# Patient Record
Sex: FEMALE | Race: WHITE | NOT HISPANIC OR LATINO | URBAN - METROPOLITAN AREA
[De-identification: names, ages, dates, MRNs, and addresses within clinical notes are randomized per-mention and may not be internally consistent; named-entity substitution may affect disease eponyms.]

---

## 2021-10-20 ENCOUNTER — OFFICE VISIT (OUTPATIENT)
Dept: URGENT CARE | Facility: CLINIC | Age: 31
End: 2021-10-20
Payer: COMMERCIAL

## 2021-10-20 VITALS
TEMPERATURE: 97 F | OXYGEN SATURATION: 99 % | RESPIRATION RATE: 16 BRPM | SYSTOLIC BLOOD PRESSURE: 111 MMHG | WEIGHT: 143 LBS | HEART RATE: 88 BPM | DIASTOLIC BLOOD PRESSURE: 60 MMHG

## 2021-10-20 DIAGNOSIS — R07.82 INTERCOSTAL PAIN: ICD-10-CM

## 2021-10-20 DIAGNOSIS — H65.192 OTHER NON-RECURRENT ACUTE NONSUPPURATIVE OTITIS MEDIA OF LEFT EAR: Primary | ICD-10-CM

## 2021-10-20 PROCEDURE — 99203 OFFICE O/P NEW LOW 30 MIN: CPT | Performed by: PHYSICIAN ASSISTANT

## 2021-10-20 RX ORDER — AMOXICILLIN 500 MG/1
500 TABLET, FILM COATED ORAL 2 TIMES DAILY
Qty: 14 TABLET | Refills: 0 | Status: SHIPPED | OUTPATIENT
Start: 2021-10-20 | End: 2021-10-27

## 2021-12-30 ENCOUNTER — CONSULT (OUTPATIENT)
Dept: PULMONOLOGY | Facility: CLINIC | Age: 31
End: 2021-12-30
Payer: COMMERCIAL

## 2021-12-30 VITALS
DIASTOLIC BLOOD PRESSURE: 76 MMHG | SYSTOLIC BLOOD PRESSURE: 116 MMHG | WEIGHT: 147.2 LBS | OXYGEN SATURATION: 98 % | HEART RATE: 83 BPM | HEIGHT: 60 IN | TEMPERATURE: 98.7 F | BODY MASS INDEX: 28.9 KG/M2

## 2021-12-30 DIAGNOSIS — R09.81 SINUS CONGESTION: ICD-10-CM

## 2021-12-30 DIAGNOSIS — R06.00 DYSPNEA ON EXERTION: Primary | ICD-10-CM

## 2021-12-30 PROBLEM — R06.09 DYSPNEA ON EXERTION: Status: ACTIVE | Noted: 2021-12-30

## 2021-12-30 PROCEDURE — 99204 OFFICE O/P NEW MOD 45 MIN: CPT | Performed by: INTERNAL MEDICINE

## 2021-12-30 RX ORDER — ALBUTEROL SULFATE 90 UG/1
2 AEROSOL, METERED RESPIRATORY (INHALATION) EVERY 4 HOURS PRN
Qty: 18 G | Refills: 1 | Status: SHIPPED | OUTPATIENT
Start: 2021-12-30

## 2021-12-30 RX ORDER — MESALAMINE 1.2 G/1
1200 TABLET, DELAYED RELEASE ORAL
COMMUNITY

## 2021-12-30 RX ORDER — FLUTICASONE PROPIONATE 50 MCG
1 SPRAY, SUSPENSION (ML) NASAL DAILY
Qty: 18.2 ML | Refills: 0 | Status: SHIPPED | OUTPATIENT
Start: 2021-12-30 | End: 2022-01-27

## 2022-01-27 DIAGNOSIS — R09.81 SINUS CONGESTION: ICD-10-CM

## 2022-01-27 RX ORDER — FLUTICASONE PROPIONATE 50 MCG
SPRAY, SUSPENSION (ML) NASAL
Qty: 16 ML | Refills: 11 | Status: SHIPPED | OUTPATIENT
Start: 2022-01-27

## 2022-02-10 ENCOUNTER — TELEPHONE (OUTPATIENT)
Dept: PULMONOLOGY | Facility: MEDICAL CENTER | Age: 32
End: 2022-02-10

## 2022-02-10 NOTE — TELEPHONE ENCOUNTER
Attempted to contact patient to reschedule  missed appointment unable to [facility] mailbox full  No show letter mailed

## 2022-03-09 ENCOUNTER — APPOINTMENT (OUTPATIENT)
Dept: RADIOLOGY | Facility: CLINIC | Age: 32
End: 2022-03-09
Payer: COMMERCIAL

## 2022-03-09 ENCOUNTER — OFFICE VISIT (OUTPATIENT)
Dept: OBGYN CLINIC | Facility: CLINIC | Age: 32
End: 2022-03-09
Payer: COMMERCIAL

## 2022-03-09 VITALS
DIASTOLIC BLOOD PRESSURE: 68 MMHG | HEIGHT: 60 IN | SYSTOLIC BLOOD PRESSURE: 102 MMHG | HEART RATE: 88 BPM | WEIGHT: 145 LBS | BODY MASS INDEX: 28.47 KG/M2

## 2022-03-09 DIAGNOSIS — S76.312A HAMSTRING STRAIN, LEFT, INITIAL ENCOUNTER: Primary | ICD-10-CM

## 2022-03-09 DIAGNOSIS — G89.29 CHRONIC PAIN OF BOTH KNEES: ICD-10-CM

## 2022-03-09 DIAGNOSIS — M25.562 PAIN IN BOTH KNEES, UNSPECIFIED CHRONICITY: ICD-10-CM

## 2022-03-09 DIAGNOSIS — M25.562 CHRONIC PAIN OF BOTH KNEES: ICD-10-CM

## 2022-03-09 DIAGNOSIS — M25.561 CHRONIC PAIN OF BOTH KNEES: ICD-10-CM

## 2022-03-09 DIAGNOSIS — M54.50 LUMBAR SPINE PAIN: ICD-10-CM

## 2022-03-09 DIAGNOSIS — M25.561 PAIN IN BOTH KNEES, UNSPECIFIED CHRONICITY: ICD-10-CM

## 2022-03-09 PROCEDURE — 73562 X-RAY EXAM OF KNEE 3: CPT

## 2022-03-09 PROCEDURE — 99203 OFFICE O/P NEW LOW 30 MIN: CPT | Performed by: ORTHOPAEDIC SURGERY

## 2022-03-09 NOTE — PROGRESS NOTES
Assessment/Plan:  1  Hamstring strain, left, initial encounter  Ambulatory Referral to Physical Therapy   2  Chronic pain of both knees  XR knee 3 vw left non injury    XR knee 3 vw right non injury    Ambulatory Referral to Physical Therapy   3  Lumbar spine pain  Ambulatory Referral to Physical Therapy     Scribe Attestation    I,:  Michelle Riojsa am acting as a scribe while in the presence of the attending physician :       I,:  Mazin Eubanks,  personally performed the services described in this documentation    as scribed in my presence :         Henri Burt is a pleasant 81-year-old female who presents today for initial evaluation of her bilateral knee pain  After reviewing her imaging and performing a thorough history and physical exam I explained that her pain complaints and exam today are consistent with a hamstring strain  She does also present with some lumbar spine pain on exam today  I encouraged her to follow-up with Cardiology tomorrow as scheduled for her chest pain and shortness of breath  I believe this might be psychosomatic in nature  With respect to her pain, I recommended initiating physical therapy and provided her with a referral today  I would like to see her back in 4 weeks for repeat clinical evaluation  Advanced imaging may be warranted at that time pending her symptoms  Subjective:  Initial evaluation for bilateral knee pain    Patient ID: Faisal Blanca is a 28 y o  female who presents today for initial evaluation of her bilateral knee pain  She reports that this pain began approximately 1 month ago and is worse on the left over the right  She describes sharp and electric pain about the posterior aspect of her knees  This pain can radiate proximally about the posterior aspect of her leg and is also accompanied by chest pain and shortness of breath  She reports that the chest pain and shortness of breath quickly resolves after onset    She reports that her pain is better in the morning upon waking and worsens after sitting for prolonged periods of time  She was recently evaluated in the emergency department at [facility] and was instructed to follow-up with the cardiologist and an orthopedist   She has a follow-up with Cardiology tomorrow  She denies any acute injury or trauma  She denies any mechanical symptoms about the knee  She denies any distal paresthesias of the lower extremity or back pain  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  Review of Systems   Constitutional: Positive for activity change  Negative for chills, fever and unexpected weight change  HENT: Negative for hearing loss, nosebleeds and sore throat  Eyes: Negative for pain, redness and visual disturbance  Respiratory: Negative for cough, shortness of breath and wheezing  Cardiovascular: Negative for chest pain, palpitations and leg swelling  Gastrointestinal: Negative for abdominal pain, nausea and vomiting  Endocrine: Negative for polydipsia and polyuria  Genitourinary: Negative for dysuria and hematuria  Musculoskeletal: Positive for arthralgias and myalgias  Negative for joint swelling  See HPI   Skin: Negative for rash and wound  Neurological: Negative for dizziness, numbness and headaches  Psychiatric/Behavioral: Negative for decreased concentration and suicidal ideas  The patient is not nervous/anxious  Past Medical History:   Diagnosis Date    PKU (phenylketonuria) ([facility] )     UC (ulcerative colitis confined to rectum) ([facility] )        History reviewed  No pertinent surgical history      Family History   Family history unknown: Yes       Social History     Occupational History    Not on file   Tobacco Use    Smoking status: Never Smoker    Smokeless tobacco: Never Used   Vaping Use    Vaping Use: Never used   Substance and Sexual Activity    Alcohol use: Yes     Comment: social    Drug use: Never    Sexual activity: Not on file         Current Outpatient Medications:     albuterol (Ventolin HFA) 90 mcg/act inhaler, Inhale 2 puffs every 4 (four) hours as needed for wheezing, Disp: 18 g, Rfl: 1    fluticasone (FLONASE) 50 mcg/act nasal spray, SPRAY 1 SPRAY INTO EACH NOSTRIL EVERY DAY, Disp: 16 mL, Rfl: 11    mesalamine (LIALDA) 1 2 g EC tablet, Take 1,200 mg by mouth daily with breakfast, Disp: , Rfl:     Allergies   Allergen Reactions    Phenylalanine Other (See Comments)    Aspartame - Food Allergy Other (See Comments)     Has PKU    Protein C Other (See Comments)     Has PKU       Objective:  Vitals:    03/09/22 0822   BP: 102/68   Pulse: 88       Body mass index is 28 32 kg/m²  Right Knee Exam     Tenderness   The patient is experiencing no tenderness  Range of Motion   Right knee extension: 0  Right knee flexion: 135  Tests   Madalyn:  Medial - negative Lateral - negative  Varus: negative Valgus: negative  Drawer:  Anterior - negative    Posterior - negative    Other   Erythema: absent  Scars: absent  Sensation: normal  Pulse: present  Swelling: none  Effusion: no effusion present    Comments:  Stable at 0, 30 90°  Neurovascularly intact distally  No warmth or erythema  No crepitance appreciated  Patellofemoral grind:  Negative  Apley's:  Negative  Hamstring 5/5      Left Knee Exam     Tenderness   The patient is experiencing no tenderness  Range of Motion   Left knee extension: 0  Left knee flexion: 135       Tests   Madalyn:  Medial - negative Lateral - negative  Varus: negative Valgus: negative  Drawer:  Anterior - negative     Posterior - negative    Other   Erythema: absent  Scars: present (traumatic anterior knee scars)  Sensation: normal  Pulse: present  Swelling: none  Effusion: no effusion present    Comments:  Stable at 0, 30 90°  Neurovascularly intact distally  No warmth or erythema  No crepitance appreciated  Patellofemoral grind:  Negative  Apley's:  Negative  Neuro SLR: negative  Hamstring 4+/5      Back Exam Tenderness   The patient is experiencing tenderness in the lumbar (left lumbar paraspinals)  Other   Sensation: normal  Gait: normal           Observations   Left Knee   Negative for effusion  Right Knee   Negative for effusion  Physical Exam  Vitals and nursing note reviewed  Constitutional:       Appearance: She is well-developed  HENT:      Head: Normocephalic and atraumatic  Eyes:      General: No scleral icterus  Conjunctiva/sclera: Conjunctivae normal    Cardiovascular:      Rate and Rhythm: Normal rate  Pulmonary:      Effort: Pulmonary effort is normal  No respiratory distress  Musculoskeletal:      Cervical back: Normal range of motion and neck supple  Right knee: No effusion  Instability Tests: Medial Madalyn test negative and lateral Madalyn test negative  Left knee: No effusion  Instability Tests: Medial Madalyn test negative and lateral Madalyn test negative  Comments: As noted in HPI   Skin:     General: Skin is warm and dry  Neurological:      Mental Status: She is alert and oriented to person, place, and time  Psychiatric:         Behavior: Behavior normal          I have personally reviewed pertinent films in PACS  Bilateral knee x-rays obtained on 03/09/2022 reviewed demonstrating no acute fracture, dislocation, lytic or blastic lesion, or significant degenerative change

## 2022-04-03 ENCOUNTER — TELEPHONE (OUTPATIENT)
Dept: OTHER | Facility: OTHER | Age: 32
End: 2022-04-03

## 2022-04-03 NOTE — TELEPHONE ENCOUNTER
Pt is requesting a call back from the office to make a new patient appointment  Patient is looking to become pregnant

## 2022-04-05 NOTE — TELEPHONE ENCOUNTER
Spoke with patient looking to get pregnant , but has a lot of medical conditions that require her to need a High Risk maternal Fetal medicine doc as well explained to her how we handled high risk patient in conjunction with MFM , gave patient number to both our office and MFM will be reaching out to them and calling back to schedule appointment    MM JAMES

## 2022-04-06 ENCOUNTER — TELEPHONE (OUTPATIENT)
Dept: PERINATAL CARE | Facility: CLINIC | Age: 32
End: 2022-04-06

## 2022-04-06 NOTE — TELEPHONE ENCOUNTER
Patient scheduled for a preconception visit  She will be having some of her most recent tests and notes pertaining to her health faxed to me specifically for the consult

## 2022-04-06 NOTE — TELEPHONE ENCOUNTER
Patient called, left voicemail message looking for a preconception visit  Called her back and left a message for her to call us back

## 2022-04-30 PROBLEM — Z31.69 ENCOUNTER FOR PRECONCEPTION CONSULTATION: Status: RESOLVED | Noted: 2022-04-30 | Resolved: 2022-04-30

## 2022-04-30 PROBLEM — Z31.69 ENCOUNTER FOR PRECONCEPTION CONSULTATION: Status: ACTIVE | Noted: 2022-04-30

## 2022-04-30 PROBLEM — E70.1 PKU (PHENYLKETONURIA) (HCC): Status: ACTIVE | Noted: 2017-08-16

## 2022-04-30 PROBLEM — K51.90 ULCERATIVE COLITIS (HCC): Status: ACTIVE | Noted: 2017-08-16

## 2022-04-30 RX ORDER — SULFAMETHOXAZOLE AND TRIMETHOPRIM 800; 160 MG/1; MG/1
1 TABLET ORAL EVERY 12 HOURS
COMMUNITY
Start: 2022-03-29

## 2022-04-30 RX ORDER — SAPROPTERIN DIHYDROCHLORIDE 100 MG/1
12 TABLET ORAL
COMMUNITY
Start: 2022-04-01

## 2022-05-03 ENCOUNTER — TELEPHONE (OUTPATIENT)
Dept: PERINATAL CARE | Facility: CLINIC | Age: 32
End: 2022-05-03

## 2022-05-04 ENCOUNTER — CONSULT (OUTPATIENT)
Dept: PERINATAL CARE | Facility: CLINIC | Age: 32
End: 2022-05-04
Payer: COMMERCIAL

## 2022-05-04 VITALS
WEIGHT: 144 LBS | DIASTOLIC BLOOD PRESSURE: 67 MMHG | HEART RATE: 79 BPM | BODY MASS INDEX: 28.27 KG/M2 | HEIGHT: 60 IN | SYSTOLIC BLOOD PRESSURE: 123 MMHG

## 2022-05-04 DIAGNOSIS — K51.919 ULCERATIVE COLITIS WITH COMPLICATION, UNSPECIFIED LOCATION (HCC): ICD-10-CM

## 2022-05-04 DIAGNOSIS — E70.1 PKU (PHENYLKETONURIA) (HCC): Primary | ICD-10-CM

## 2022-05-04 DIAGNOSIS — Z31.69 ENCOUNTER FOR PRECONCEPTION CONSULTATION: ICD-10-CM

## 2022-05-04 PROCEDURE — 99205 OFFICE O/P NEW HI 60 MIN: CPT | Performed by: OBSTETRICS & GYNECOLOGY

## 2022-05-04 RX ORDER — LANOLIN ALCOHOL/MO/W.PET/CERES
800 CREAM (GRAM) TOPICAL DAILY
Qty: 90 TABLET | Refills: 5 | Status: SHIPPED | OUTPATIENT
Start: 2022-05-04

## 2022-05-04 NOTE — ASSESSMENT & PLAN NOTE
Diagnosed age 25; initial flare resolved with prednisone  Hospitalized twice in age 23 for two weeks; was on prednisone for 7 months; went into remission  Had Remicade, lost 25% of her hair and had many adverse effects; stopped 4 years ago  Has not had issues since and is OFF lialda (mesalamine)  Nixon Bradford is 7/16/22  Dr Roney Hayes at [facility] is her primary GI  Last saw 2 5-3y ago  Is supposed to take mesalamine daily  Advised to revisit with GI  Discussed importance of quiescent disease

## 2022-05-04 NOTE — ASSESSMENT & PLAN NOTE
Dr Mariann Campos and dietician is Bland Skiff at [facility] in [place]  Changed diet in February  Levels checked qweek while on preconception diet (previously every month or so)  Is aware that goal phenylalanine level would be between 2-6 mg/dL throughout gestation and that better maternal outcomes are associated with control in that range  Off Kuvan, levels were very high  Only started Charlyne Antis recently, was skeptical prior  Would continue care with Dr David Larose throughout a preganncy  Requires 12-14 packets per day which each cost $50; has not had a problem with coverage  Goals during preconception period:  1  Get Ishmael screened, after 7/16/22 (when they get ), or via lower cost option beforehand  2  Generate (via self experimentation) a list of PKU diet-friendly carbohydrates in anticipation of first trimester nausea and vomiting of pregnancy  Will need to be aggressive about prevention of first trimester N/V  Discussed co-management alongside obstetrician and various different models at [facility] as she does not currently have an obstetrician

## 2022-05-04 NOTE — LETTER
May 5, 2022     Dr Jo Ann Yanez  [phone]    Patient: Wayne Coelho   YOB: 1990   Date of Visit: 2022       Dear Dr Josh Tierney:    Thank you for referring Wayne Coelho to me for evaluation  Below are my notes for this consultation  If you have questions, please do not hesitate to call me  I look forward to following your patient along with you  Sincerely,        Stephanie Madrigal MD        CC: No Recipients  Stephanie Madrigal MD  2022  5:54 AM  Sign when Signing Visit  PRECONCEPTION CONSULTATION: MATERNAL-FETAL MEDICINE     Assessment and Plan: Ms Cesario Carvalho is a 28y o  year-old  here for preconception counseling  By issue:    Problem List Items Addressed This Visit        Digestive    Ulcerative colitis [facility])     Diagnosed age 25; initial flare resolved with prednisone  Hospitalized twice in age 23 for two weeks; was on prednisone for 7 months; went into remission  Had Remicade, lost 25% of her hair and had many adverse effects; stopped 4 years ago  Has not had issues since and is OFF lialda (mesalamine)  Virginia Estrella is 23  Dr Corby Rodriguez at [facility] is her primary GI  Last saw 2 5-3y ago  Is supposed to take mesalamine daily  Advised to revisit with GI  Discussed importance of quiescent disease  Other    PKU (phenylketonuria) ([address] ) - Primary     Dr Jo Ann Yanez and dietician is Pj Oneal at [facility] in [place]  Changed diet in February  Levels checked qweek while on preconception diet (previously every month or so)  Is aware that goal phenylalanine level would be between 2-6 mg/dL throughout gestation and that better maternal outcomes are associated with control in that range  Off Kuvan, levels were very high  Only started Lauren Chand recently, was skeptical prior  Would continue care with Dr Josh Tierney throughout a preganncy    Requires 12-14 packets per day which each cost $50; has not had a problem with coverage  Goals during preconception period:  1  Get Ishmael screened, after 22 (when they get ), or via lower cost option beforehand  2  Generate (via self experimentation) a list of PKU diet-friendly carbohydrates in anticipation of first trimester nausea and vomiting of pregnancy  Will need to be aggressive about prevention of first trimester N/V  Discussed co-management alongside obstetrician and various different models at [facility] as she does not currently have an obstetrician  Relevant Medications    acetone, urine, test strip    folic acid (FOLVITE) 246 mcg tablet    Encounter for preconception consultation     Offered routine carrier screening which she accepts  Discussed folate supplementation to prevent NTDs, sent Rx given potential for prenatal vitamins to cause constipation  Discussed aggressive first trimester control/prevention of nausea and vomiting given side effect of dehydration from there Kuvan medication as well as the expense of the Kuvan medication  Discussed schedule of routine OB care alongside MFM followup as well as aneuploidy screening  Would advise serial growth assessment throughout the third trimester  Relevant Orders    Cystic fibrosis gene test    SMA Carrier Screen    Hemoglobin Electrophoresis              Referring physician:   Referral [facility],  [address]    Reason for consultation:   Chief Complaint   Patient presents with    Consult     preconception consultation regarding maternal PKU and ulcerative colitis  Nkechi Rossi is getting  this summer and will consider trying to achieve conception thereafter  Dear Dr Alessandra Gallagher,    I recently saw your patient Dinora Marlow for preconception Maternal-Fetal Medicine consultation regarding her PKU and ulcerative colitis  As you know, Ms Suzie Li is a 28y o  year-old     Her history is as follows:    Past Medical History:   Diagnosis Date    [facility]) ([facility] )     Works with Dr Sunshine Lazaro from [facility]    UC (ulcerative colitis confined to rectum) [facility])        No past medical history pertinent negatives  No past surgical history on file  No past surgical history pertinent negatives on file  OB History    Para Term  AB Living   0 0 0 0 0 0   SAB IAB Ectopic Multiple Live Births   0 0 0 0 0       Social History     Tobacco Use    Smoking status: Never Smoker    Smokeless tobacco: Never Used   Vaping Use    Vaping Use: Never used   Substance Use Topics    Alcohol use: Yes     Comment: social    Drug use: Never       Family History   Family history unknown: Yes       Family history per our office screening tool   is negative for Ashkenazi Yazidism ancestry, birth defects, blood clot in legs or lungs, diabetes, early-onset breast ovarian or colon cancer; Down syndrome or other chromosome problem, genetic condition or carrier state for cystic fibrosis, sickle cell, thalassemia, [place]-Sachs, or spinal muscular atrophy; hemophilia or von Willebrand's disease; preeclampsia or hypertension, or opiate use or overdose  Her dad was adopted and they have never met; he is alive and has a history of alcoholism  She has no siblings        Current Outpatient Medications:     albuterol (Ventolin HFA) 90 mcg/act inhaler, Inhale 2 puffs every 4 (four) hours as needed for wheezing, Disp: 18 g, Rfl: 1    fluticasone (FLONASE) 50 mcg/act nasal spray, SPRAY 1 SPRAY INTO EACH NOSTRIL EVERY DAY, Disp: 16 mL, Rfl: 11    mesalamine (LIALDA) 1 2 g EC tablet, Take 1,200 mg by mouth daily with breakfast, Disp: , Rfl:     Sapropterin Dihydrochloride (Kuvan) 100 MG TABS, Take 12 tablets by mouth, Disp: , Rfl:     acetone, urine, test strip, Use 1 strip in the morning, Disp: 25 each, Rfl: 5    folic acid (FOLVITE) 262 mcg tablet, Take 2 tablets (800 mcg total) by mouth daily, Disp: 90 tablet, Rfl: 5   sulfamethoxazole-trimethoprim (BACTRIM DS) 800-160 mg per tablet, Take 1 tablet by mouth every 12 (twelve) hours (Patient not taking: Reported on 5/4/2022 ), Disp: , Rfl:     sulfamethoxazole-trimethoprim (BACTRIM DS) 800-160 mg per tablet, Take 1 tablet by mouth every 12 (twelve) hours (Patient not taking: Reported on 5/4/2022 ), Disp: , Rfl:     Allergies   Allergen Reactions    Phenylalanine Other (See Comments)    Aspartame - Food Allergy Other (See Comments)     Has PKU    Protein C Other (See Comments)     Has PKU       Occupation: Teacher, 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  Spouse/Partner Name: Adilene Brown; Age 39; occupation:   He is good health though he smokes, not the healthiest diet  He has never had genetic testing  HPI    Review of Systems   Constitutional: Negative for chills, fever and unexpected weight change  Lost 12# with PKU friendly diet   HENT: Negative for congestion, dental problem, facial swelling and sore throat  Eyes: Negative for visual disturbance  Respiratory: Negative for cough and shortness of breath  Cardiovascular: Negative for chest pain and palpitations  With severe anxiety   Gastrointestinal: Negative for diarrhea and vomiting  Endocrine: Negative for polydipsia  Genitourinary: Negative for dysuria and vaginal bleeding  Musculoskeletal: Negative for back pain and joint swelling  Skin: Negative for rash and wound  Allergic/Immunologic: Negative for immunocompromised state  Neurological: Negative for seizures and headaches  Occasional   Hematological: Does not bruise/bleed easily  Psychiatric/Behavioral: Negative for hallucinations and suicidal ideas  Vitals: Blood pressure 123/67, pulse 79, height 5' (1 524 m), weight 65 3 kg (144 lb)  Physical Exam  Constitutional:       General: She is not in acute distress  Appearance: Normal appearance  She is not ill-appearing, toxic-appearing or diaphoretic     HENT:      Head: Normocephalic and atraumatic  Nose: No congestion or rhinorrhea  Eyes:      General: No scleral icterus  Right eye: No discharge  Left eye: No discharge  Extraocular Movements: Extraocular movements intact  Conjunctiva/sclera: Conjunctivae normal    Pulmonary:      Effort: Pulmonary effort is normal  No respiratory distress  Musculoskeletal:      Cervical back: Normal range of motion  Skin:     Coloration: Skin is not jaundiced or pale  Findings: No erythema, lesion or rash  Neurological:      General: No focal deficit present  Mental Status: She is alert and oriented to person, place, and time  Psychiatric:         Mood and Affect: Mood normal          Behavior: Behavior normal        -------------------------    I spent 60 minutes with the patient with >50% spent in face to face counseling and coordination of care  At the conclusion of today's encounter, all questions were answered to her satisfaction  She will see us back once pregnant or sooner if any concerns  Thank you very much for this kind referral and please do not hesitate to contact me with any further questions or concerns      Sincerely,    Juliann Brito MD  Attending Physician, [facility]

## 2022-05-04 NOTE — PROGRESS NOTES
PRECONCEPTION CONSULTATION: MATERNAL-FETAL MEDICINE     Assessment and Plan: Ms Jareth Gregg is a 28y o  year-old  here for preconception counseling  By issue:    Problem List Items Addressed This Visit        Digestive    Ulcerative colitis [facility])     Diagnosed age 25; initial flare resolved with prednisone  Hospitalized twice in age 23 for two weeks; was on prednisone for 7 months; went into remission  Had Remicade, lost 25% of her hair and had many adverse effects; stopped 4 years ago  Has not had issues since and is OFF lialda (mesalamine)  Adam Yuan is 23  Dr Luke Mirza at [facility] is her primary GI  Last saw 2 5-3y ago  Is supposed to take mesalamine daily  Advised to revisit with GI  Discussed importance of quiescent disease  Other    PKU (phenylketonuria) ([address] ) - Primary     Dr Abril England and dietician is Karl Peace at [facility] in [place]  Changed diet in February  Levels checked qweek while on preconception diet (previously every month or so)  Is aware that goal phenylalanine level would be between 2-6 mg/dL throughout gestation and that better maternal outcomes are associated with control in that range  Off Kuvan, levels were very high  Only started Oda Landau recently, was skeptical prior  Would continue care with Dr Shad Sherman throughout a preganncy  Requires 12-14 packets per day which each cost $50; has not had a problem with coverage  Goals during preconception period:  1  Get Ishmael screened, after 22 (when they get ), or via lower cost option beforehand  2  Generate (via self experimentation) a list of PKU diet-friendly carbohydrates in anticipation of first trimester nausea and vomiting of pregnancy  Will need to be aggressive about prevention of first trimester N/V  Discussed co-management alongside obstetrician and various different models at [address] [place] [address] as she does not currently have an obstetrician  Relevant Medications    acetone, urine, test strip    folic acid (FOLVITE) 323 mcg tablet    Encounter for preconception consultation     Offered routine carrier screening which she accepts  Discussed folate supplementation to prevent NTDs, sent Rx given potential for prenatal vitamins to cause constipation  Discussed aggressive first trimester control/prevention of nausea and vomiting given side effect of dehydration from there Kuvan medication as well as the expense of the Kuvan medication  Discussed schedule of routine OB care alongside MFM followup as well as aneuploidy screening  Would advise serial growth assessment throughout the third trimester  Relevant Orders    Cystic fibrosis gene test    SMA Carrier Screen    Hemoglobin Electrophoresis              Referring physician:   Referral [address],  [address]    Reason for consultation:   Chief Complaint   Patient presents with    Consult     preconception consultation regarding maternal PKU and ulcerative colitis  Servando Campos is getting  this summer and will consider trying to achieve conception thereafter  Dear Dr Tiera Ribera,    I recently saw your patient Tesha Newberry for preconception Maternal-Fetal Medicine consultation regarding her PKU and ulcerative colitis  As you know, Ms Yoan Zaragoza is a 28y o  year-old   Her history is as follows:    Past Medical History:   Diagnosis Date    PK[facility] (phenylketonuria) ([facility] )     Works with Dr Rob Singer from [facility] and [address]    UC (ulcerative colitis confined to rectum) [facility])        No past medical history pertinent negatives  No past surgical history on file  No past surgical history pertinent negatives on file      OB History    Para Term  AB Living   0 0 0 0 0 0   SAB IAB Ectopic Multiple Live Births   0 0 0 0 0       Social History     Tobacco Use    Smoking status: Never Smoker    Smokeless tobacco: Never Used   Vaping Use    Vaping Use: Never used   Substance Use Topics    Alcohol use: Yes     Comment: social    Drug use: Never       Family History   Family history unknown: Yes       Family history per our office screening tool   is negative for Ashkenazi Roman Catholic ancestry, birth defects, blood clot in legs or lungs, diabetes, early-onset breast ovarian or colon cancer; Down syndrome or other chromosome problem, genetic condition or carrier state for cystic fibrosis, sickle cell, thalassemia, [place]s, or spinal muscular atrophy; hemophilia or von Willebrand's disease; preeclampsia or hypertension, or opiate use or overdose  Her dad was adopted and they have never met; he is alive and has a history of alcoholism  She has no siblings        Current Outpatient Medications:     albuterol (Ventolin HFA) 90 mcg/act inhaler, Inhale 2 puffs every 4 (four) hours as needed for wheezing, Disp: 18 g, Rfl: 1    fluticasone (FLONASE) 50 mcg/act nasal spray, SPRAY 1 SPRAY INTO EACH NOSTRIL EVERY DAY, Disp: 16 mL, Rfl: 11    mesalamine (LIALDA) 1 2 g EC tablet, Take 1,200 mg by mouth daily with breakfast, Disp: , Rfl:     Sapropterin Dihydrochloride (Kuvan) 100 MG TABS, Take 12 tablets by mouth, Disp: , Rfl:     acetone, urine, test strip, Use 1 strip in the morning, Disp: 25 each, Rfl: 5    folic acid (FOLVITE) 415 mcg tablet, Take 2 tablets (800 mcg total) by mouth daily, Disp: 90 tablet, Rfl: 5    sulfamethoxazole-trimethoprim (BACTRIM DS) 800-160 mg per tablet, Take 1 tablet by mouth every 12 (twelve) hours (Patient not taking: Reported on 5/4/2022 ), Disp: , Rfl:     sulfamethoxazole-trimethoprim (BACTRIM DS) 800-160 mg per tablet, Take 1 tablet by mouth every 12 (twelve) hours (Patient not taking: Reported on 5/4/2022 ), Disp: , Rfl:     Allergies   Allergen Reactions    Phenylalanine Other (See Comments)    Aspartame - Food Allergy Other (See Comments)     Has PKU    Protein C Other (See Comments)     Has PKU       Occupation: Teacher, 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  Spouse/Partner Name: Omar Rutherford; Age 39; occupation:   He is good health though he smokes, not the healthiest diet  He has never had genetic testing  HPI    Review of Systems   Constitutional: Negative for chills, fever and unexpected weight change  Lost 12# with PKU friendly diet   HENT: Negative for congestion, dental problem, facial swelling and sore throat  Eyes: Negative for visual disturbance  Respiratory: Negative for cough and shortness of breath  Cardiovascular: Negative for chest pain and palpitations  With severe anxiety   Gastrointestinal: Negative for diarrhea and vomiting  Endocrine: Negative for polydipsia  Genitourinary: Negative for dysuria and vaginal bleeding  Musculoskeletal: Negative for back pain and joint swelling  Skin: Negative for rash and wound  Allergic/Immunologic: Negative for immunocompromised state  Neurological: Negative for seizures and headaches  Occasional   Hematological: Does not bruise/bleed easily  Psychiatric/Behavioral: Negative for hallucinations and suicidal ideas  Vitals: Blood pressure 123/67, pulse 79, height 5' (1 524 m), weight 65 3 kg (144 lb)  Physical Exam  Constitutional:       General: She is not in acute distress  Appearance: Normal appearance  She is not ill-appearing, toxic-appearing or diaphoretic  HENT:      Head: Normocephalic and atraumatic  Nose: No congestion or rhinorrhea  Eyes:      General: No scleral icterus  Right eye: No discharge  Left eye: No discharge  Extraocular Movements: Extraocular movements intact  Conjunctiva/sclera: Conjunctivae normal    Pulmonary:      Effort: Pulmonary effort is normal  No respiratory distress  Musculoskeletal:      Cervical back: Normal range of motion  Skin:     Coloration: Skin is not jaundiced or pale  Findings: No erythema, lesion or rash     Neurological:      General: No focal deficit present  Mental Status: She is alert and oriented to person, place, and time  Psychiatric:         Mood and Affect: Mood normal          Behavior: Behavior normal        -------------------------    I spent 60 minutes with the patient with >50% spent in face to face counseling and coordination of care  At the conclusion of today's encounter, all questions were answered to her satisfaction  She will see us back once pregnant or sooner if any concerns  Thank you very much for this kind referral and please do not hesitate to contact me with any further questions or concerns      Sincerely,    Adwoa Sharma MD  Attending Physician, [facility]

## 2022-05-05 ENCOUNTER — TELEPHONE (OUTPATIENT)
Dept: PERINATAL CARE | Facility: CLINIC | Age: 32
End: 2022-05-05

## 2022-05-05 NOTE — ASSESSMENT & PLAN NOTE
Offered routine carrier screening which she accepts  Discussed folate supplementation to prevent NTDs, sent Rx given potential for prenatal vitamins to cause constipation  Discussed aggressive first trimester control/prevention of nausea and vomiting given side effect of dehydration from there Kuvan medication as well as the expense of the Kuvan medication  Discussed schedule of routine OB care alongside MFM followup as well as aneuploidy screening  Would advise serial growth assessment throughout the third trimester

## 2022-08-26 ENCOUNTER — TELEPHONE (OUTPATIENT)
Dept: PERINATAL CARE | Facility: CLINIC | Age: 32
End: 2022-08-26

## 2022-08-26 DIAGNOSIS — O21.9 NAUSEA/VOMITING IN PREGNANCY: Primary | ICD-10-CM

## 2022-08-26 NOTE — TELEPHONE ENCOUNTER
Pt called very anxious just found out she is pregnant around 3w,you did see her for a preconception consult, she does have a  OB they did not put a referral in yet  I did explain the process to make a new pt appt she does need a referral  She is very concerned about getting nausea and vomiting because she takes routine medications to live and if she vomits she will not be able to take her meds  I told her you were off this week and that you would get back to her sometime next week

## 2022-08-29 RX ORDER — ONDANSETRON 4 MG/1
4 TABLET, FILM COATED ORAL EVERY 8 HOURS PRN
Qty: 20 TABLET | Refills: 0 | Status: SHIPPED | OUTPATIENT
Start: 2022-08-29

## 2022-09-16 ENCOUNTER — OB ABSTRACT (OUTPATIENT)
Dept: PERINATAL CARE | Facility: OTHER | Age: 32
End: 2022-09-16

## 2022-09-16 ENCOUNTER — TELEPHONE (OUTPATIENT)
Dept: PERINATAL CARE | Facility: CLINIC | Age: 32
End: 2022-09-16

## 2022-09-16 NOTE — TELEPHONE ENCOUNTER
Pt was seen as a preconception consult with you on 5/4, she is now 7 weeks pregnant she was told by you that if she starts to vomit to told to call you  She was vomiting profusely yesterday but today she has not  She stated you had suggest a possible feeding tube she very nervous because of her meds that she currently takes

## 2022-09-16 NOTE — TELEPHONE ENCOUNTER
Thanks, I called her and left a message (there was no answer)  I encouraged her to stay in touch with her OB and that she could go to any ED for fluids and anti-nausea medications  I'll try calling her Monday      Con Cano MD

## 2022-10-13 ENCOUNTER — TELEPHONE (OUTPATIENT)
Dept: PERINATAL CARE | Facility: OTHER | Age: 32
End: 2022-10-13

## 2022-10-13 NOTE — TELEPHONE ENCOUNTER
Called patient to check in, voicemail is full and not accepting any messages, asked [place] to call her and offer virtual visit tomorrow or next Thursday with me      Kathe Public

## 2022-10-13 NOTE — TELEPHONE ENCOUNTER
----- Message from Jesusita Garcia sent at 10/12/2022  3:54 PM EDT -----  Good afternoon Dr Melquiades Sparks,    I spoke with Caitlyn Cuadra this afternoon who had many questions regarding her recent loss  She is concerned that she may have done something wrong but would like to discuss a few things with you  I did let her know that I would reach out to you in regard to our conversation today  [place] would like to convince again as soon as possible and would like to consult you before doing so  Please let me know how I can help  Thank you      Justina Cortés

## 2022-10-20 ENCOUNTER — TELEMEDICINE (OUTPATIENT)
Dept: PERINATAL CARE | Facility: CLINIC | Age: 32
End: 2022-10-20

## 2022-10-20 DIAGNOSIS — O03.9 MISCARRIAGE: Primary | ICD-10-CM

## 2022-10-20 NOTE — PROGRESS NOTES
Virtual Regular Visit    Assessment/Plan:    Problem List Items Addressed This Visit        Other    Miscarriage - Primary     I offered my condolences for her recent miscarriage  She has not bled in 1-2 weeks  Early pregnancy loss is common, occurring in 10% of all clinically recognized pregnancies  Approximately 80% of all cases of pregnancy loss occur within the first trimester  Approximately 50% of all cases of early pregnancy loss are due to fetal chromosomal abnormalities  I addressed her questions regarding when to attempt conception and I encouraged her to wait until she gets her PKU under better control, as since her miscarriage she has not attended well to her disease  There are no quality data to support delaying conception after early pregnancy loss to prevent subsequent early pregnancy loss or other pregnancy complications  Small observational studies show no benefit to delayed conception after early pregnancy loss  Abstaining from vaginal intercourse for 1-2 weeks after complete passage of pregnancy tissue generally is recommended to reduce the risk of infection, but this is not an evidence-based recommendation  I addressed her questions regarding timed intercourse and ovulation and encouraged her to establish care with and stay in contact with her OB/GYN regarding this  I encouraged her to keep her OB care and M care within the same health system and she is considering her options [facility] or [address] [place] [address])  Before her next pregnancy she will establish care with an OB/GYN of her choosing and if she needs help with that I am available                     Reason for visit is   Chief Complaint   Patient presents with   • Virtual Regular Visit        Encounter provider Toya York MD    Provider located at [address] [place] [id number] [phone]      Recent Visits  Date Type Provider Dept   10/13/22 Telephone Linda Cat MD [address]   Showing recent visits within past 7 days and meeting all other requirements  Today's Visits  Date Type Provider Dept   10/20/22 [facility], [address] [place] [address]  today's visits and meeting all other requirements  Future Appointments  No visits were found meeting these conditions  Showing future appointments within next 150 days and meeting all other requirements       The patient was identified by name and date of birth  Karine Coyne was informed that this is a telemedicine visit and that the visit is being conducted through the [address] [employer] platform  She agrees to proceed     My office door was closed  No one else was in the room  She acknowledged consent and understanding of privacy and security of the video platform  The patient has agreed to participate and understands they can discontinue the visit at any time  Patient is aware this is a billable service  Subjective  Karine Coyne is a 33 y o  female  virtually to discuss recent loss  HPI     Past Medical History:   Diagnosis Date   • PKU (phenylketonuria) ([facility] )     Works with Dr Connie Zhang from [place] [address]   • UC (ulcerative colitis confined to rectum) [facility])        No past surgical history on file      Current Outpatient Medications   Medication Sig Dispense Refill   • acetone, urine, test strip Use 1 strip in the morning 25 each 5   • albuterol (Ventolin HFA) 90 mcg/act inhaler Inhale 2 puffs every 4 (four) hours as needed for wheezing 18 g 1   • fluticasone (FLONASE) 50 mcg/act nasal spray SPRAY 1 SPRAY INTO EACH NOSTRIL EVERY DAY 16 mL 11   • folic acid (FOLVITE) 152 mcg tablet Take 2 tablets (800 mcg total) by mouth daily 90 tablet 5   • mesalamine (LIALDA) 1 2 g EC tablet Take 1,200 mg by mouth daily with breakfast     • ondansetron (ZOFRAN) 4 mg tablet Take 1 tablet (4 mg total) by mouth every 8 (eight) hours as needed for nausea or vomiting 20 tablet 0   • Sapropterin Dihydrochloride (Kuvan) 100 MG TABS Take 12 tablets by mouth       No current facility-administered medications for this visit  Allergies   Allergen Reactions   • Phenylalanine Other (See Comments)   • Aspartame - Food Allergy Other (See Comments)     Has PKU   • Protein C Other (See Comments)     Has PKU         The time spent on this established patient on the encounter date included 5 minutes previsit service time reviewing records and precharting, 27 minutes face-to-face service time counseling regarding results and coordinating care, and  12 minutes charting, totalling 44 minutes  Please don't hesitate to contact our office with any concerns or questions    Dyan Penn

## 2022-10-20 NOTE — ASSESSMENT & PLAN NOTE
I offered my condolences for her recent miscarriage  She has not bled in 1-2 weeks  Early pregnancy loss is common, occurring in 10% of all clinically recognized pregnancies  Approximately 80% of all cases of pregnancy loss occur within the first trimester  Approximately 50% of all cases of early pregnancy loss are due to fetal chromosomal abnormalities  I addressed her questions regarding when to attempt conception and I encouraged her to wait until she gets her PKU under better control, as since her miscarriage she has not attended well to her disease  There are no quality data to support delaying conception after early pregnancy loss to prevent subsequent early pregnancy loss or other pregnancy complications  Small observational studies show no benefit to delayed conception after early pregnancy loss  Abstaining from vaginal intercourse for 1-2 weeks after complete passage of pregnancy tissue generally is recommended to reduce the risk of infection, but this is not an evidence-based recommendation  I addressed her questions regarding timed intercourse and ovulation and encouraged her to establish care with and stay in contact with her OB/GYN regarding this  I encouraged her to keep her OB care and MFM care within the same health system and she is considering her options [facility] or [facility])  Before her next pregnancy she will establish care with an OB/GYN of her choosing and if she needs help with that I am available